# Patient Record
Sex: MALE | Race: WHITE | NOT HISPANIC OR LATINO | URBAN - METROPOLITAN AREA
[De-identification: names, ages, dates, MRNs, and addresses within clinical notes are randomized per-mention and may not be internally consistent; named-entity substitution may affect disease eponyms.]

---

## 2024-07-10 ENCOUNTER — OFFICE VISIT (OUTPATIENT)
Dept: URGENT CARE | Facility: CLINIC | Age: 39
End: 2024-07-10
Payer: COMMERCIAL

## 2024-07-10 VITALS
OXYGEN SATURATION: 98 % | TEMPERATURE: 99 F | DIASTOLIC BLOOD PRESSURE: 72 MMHG | BODY MASS INDEX: 37.19 KG/M2 | WEIGHT: 315 LBS | SYSTOLIC BLOOD PRESSURE: 106 MMHG | HEART RATE: 98 BPM | RESPIRATION RATE: 20 BRPM | HEIGHT: 77 IN

## 2024-07-10 DIAGNOSIS — M79.672 LEFT FOOT PAIN: Primary | ICD-10-CM

## 2024-07-10 DIAGNOSIS — Z87.39 HX OF GOUT: ICD-10-CM

## 2024-07-10 PROCEDURE — 96372 THER/PROPH/DIAG INJ SC/IM: CPT | Mod: ,,,

## 2024-07-10 PROCEDURE — 99203 OFFICE O/P NEW LOW 30 MIN: CPT | Mod: 25,,,

## 2024-07-10 RX ORDER — SYRING-NEEDL,DISP,INSUL,0.3 ML 29 G X1/2"
296 SYRINGE, EMPTY DISPOSABLE MISCELLANEOUS ONCE
COMMUNITY

## 2024-07-10 RX ORDER — DEXAMETHASONE SODIUM PHOSPHATE 100 MG/10ML
10 INJECTION INTRAMUSCULAR; INTRAVENOUS
Status: COMPLETED | OUTPATIENT
Start: 2024-07-10 | End: 2024-07-10

## 2024-07-10 RX ORDER — PNV NO.95/FERROUS FUM/FOLIC AC 28MG-0.8MG
100 TABLET ORAL DAILY
COMMUNITY

## 2024-07-10 RX ORDER — TIRZEPATIDE 12.5 MG/.5ML
INJECTION, SOLUTION SUBCUTANEOUS
COMMUNITY
Start: 2024-06-23

## 2024-07-10 RX ORDER — KETOROLAC TROMETHAMINE 30 MG/ML
60 INJECTION, SOLUTION INTRAMUSCULAR; INTRAVENOUS
Status: COMPLETED | OUTPATIENT
Start: 2024-07-10 | End: 2024-07-10

## 2024-07-10 RX ORDER — COLCHICINE 0.6 MG/1
TABLET ORAL
Qty: 4 TABLET | Refills: 0 | Status: SHIPPED | OUTPATIENT
Start: 2024-07-10 | End: 2024-07-13

## 2024-07-10 RX ORDER — INDOMETHACIN 50 MG/1
50 CAPSULE ORAL 3 TIMES DAILY
Qty: 21 CAPSULE | Refills: 0 | Status: SHIPPED | OUTPATIENT
Start: 2024-07-10 | End: 2024-07-17

## 2024-07-10 RX ORDER — ALLOPURINOL 100 MG/1
100 TABLET ORAL EVERY MORNING
COMMUNITY
Start: 2024-03-25

## 2024-07-10 RX ORDER — OLMESARTAN MEDOXOMIL 20 MG/1
20 TABLET ORAL
COMMUNITY
Start: 2024-03-25

## 2024-07-10 RX ADMIN — KETOROLAC TROMETHAMINE 60 MG: 30 INJECTION, SOLUTION INTRAMUSCULAR; INTRAVENOUS at 11:07

## 2024-07-10 RX ADMIN — DEXAMETHASONE SODIUM PHOSPHATE 10 MG: 100 INJECTION INTRAMUSCULAR; INTRAVENOUS at 11:07

## 2024-07-10 NOTE — PATIENT INSTRUCTIONS
Do not take prescription indomethacin today due to Toradol injection.  May begin tomorrow.  Take prescription anti-inflammatories with food.  Get plenty of rest.    Follow-up with primary care provider as needed.  Go to the Emergency Department with any significant change or worsening symptoms.

## 2024-07-10 NOTE — PROGRESS NOTES
"Subjective:      Patient ID: Philip Mcclure is a 39 y.o. male.    Vitals:  height is 6' 5" (1.956 m) and weight is 154.2 kg (340 lb) (abnormal). His temperature is 98.9 °F (37.2 °C). His blood pressure is 106/72 and his pulse is 98. His respiration is 20 and oxygen saturation is 98%.     Chief Complaint: Pain     Patient is a 39 y.o. male who presents to urgent care with complaints of gout on left side of foot near the 5th metatarsal. Started this morning. Patient has hx of gout in that foot already.  Pain similar to his previous gout flare-ups.  Takes allopurinol daily.  Denies any known injury or trauma, fever, numbness, decreased movement.  Denies any alcohol intake however he does report eating ball strep over the weekend.  Usually has steroid injection, Toradol injection as well as indomethacin colchicine prescription.        Skin:  Negative for erythema.      Objective:     Physical Exam   Constitutional: He is oriented to person, place, and time. He appears well-developed.   HENT:   Head: Normocephalic and atraumatic. Head is without abrasion, without contusion and without laceration.   Ears:   Right Ear: External ear normal.   Left Ear: External ear normal.   Nose: Nose normal.   Mouth/Throat: Oropharynx is clear and moist and mucous membranes are normal.   Eyes: Conjunctivae, EOM and lids are normal. Pupils are equal, round, and reactive to light.   Neck: Trachea normal and phonation normal. Neck supple.   Cardiovascular: Normal rate, regular rhythm and normal heart sounds.   Pulmonary/Chest: Effort normal and breath sounds normal. No stridor. No respiratory distress.   Musculoskeletal: Normal range of motion.         General: Tenderness present. No swelling. Normal range of motion.        Legs:       Left foot: Normal range of motion and normal capillary refill. Tenderness present.      Comments: TTP to the left 5th meta tarsal area, no noted warmth, decreased range of motion, swelling, full range of motion " distally   Neurological: He is alert and oriented to person, place, and time.   Skin: Skin is warm, dry, intact and no rash. Capillary refill takes less than 2 seconds. No abrasion, No burn, No bruising, No erythema and No ecchymosis   Psychiatric: His speech is normal and behavior is normal. Judgment and thought content normal.   Nursing note and vitals reviewed.      Assessment:     1. Left foot pain    2. Hx of gout        Plan:       Left foot pain  -     indomethacin (INDOCIN) 50 MG capsule; Take 1 capsule (50 mg total) by mouth 3 (three) times daily. for 7 days  Dispense: 21 capsule; Refill: 0  -     colchicine (COLCRYS) 0.6 mg tablet; Take 2 tablets (1.2 mg total) by mouth once daily for 1 day, THEN 1 tablet (0.6 mg total) once daily for 2 days.  Dispense: 4 tablet; Refill: 0  -     ketorolac injection 60 mg  -     dexAMETHasone injection 10 mg    Hx of gout  -     colchicine (COLCRYS) 0.6 mg tablet; Take 2 tablets (1.2 mg total) by mouth once daily for 1 day, THEN 1 tablet (0.6 mg total) once daily for 2 days.  Dispense: 4 tablet; Refill: 0              Denies any history of kidney disease, has not taken anything today for pain.    Do not take prescription indomethacin today due to Toradol injection.  May begin tomorrow.  Take prescription anti-inflammatories with food.  Get plenty of rest.    Follow-up with primary care provider as needed.  Go to the Emergency Department with any significant change or worsening symptoms.